# Patient Record
(demographics unavailable — no encounter records)

---

## 2024-10-15 NOTE — ASSESSMENT
[FreeTextEntry1] : UBALDO is a three month old twin with a history of hypoglycemia and known reducible bilateral inguinal hernias who was brought to the ED shortly after discharge form the NICU for irritability. The decision was made to take the patient to the OR.  HE was taken to the OR with Dr. Garay on 9/26 and underwent a laparoscopic bilateral inguinal hernia repair.  He tolerated it well and was discharged home on POD2.  He presents today for a routine post op visit.  On exam his incisions are well healed.  Dr. Garay in to see the examine the patient and is pleased with his recovery.  Mom knows of the small chance of recurrence and will come back in with any concerns.    Follow up with the pediatrician as usual and with pediatric surgery should any new or concerning symptoms arise. All questions answered.

## 2024-10-15 NOTE — REASON FOR VISIT
[____ Week(s)] : [unfilled] week(s)  [Laparoscopic inguinal hernia repair] : laparoscopic inguinal hernia repair [Mother] : mother [Pain] : ~He/She~ does not have pain [Fever] : ~He/She~ does not have fever [Normal bowel movements] : ~He/She~ has normal bowel movements [Vomiting] : ~He/She~ does not have vomiting [Tolerating Diet] : ~He/She~ is tolerating diet [Redness at incision] : ~He/She~ does not have redness at incision [Drainage at incision] : ~He/She~ does not have drainage at incision [Swelling at surgical site] : ~He/She~ does not have swelling at surgical site [de-identified] : 9/24/24 [de-identified] : Dr. Miguel

## 2024-10-15 NOTE — CONSULT LETTER
[Dear  ___] : Dear  [unfilled], [Courtesy Letter:] : I had the pleasure of seeing your patient, [unfilled], in my office today. [Please see my note below.] : Please see my note below. [Consult Closing:] : Thank you very much for allowing me to participate in the care of this patient.  If you have any questions, please do not hesitate to contact me. [Sincerely,] : Sincerely, [FreeTextEntry2] : Dr. Pearson [FreeTextEntry3] : Zaria Morales PA-C Supervisor, [facility] Department of General Pediatric Surgery & Trauma [place], [place] [address]

## 2024-10-15 NOTE — CONSULT LETTER
[Dear  ___] : Dear  [unfilled], [Courtesy Letter:] : I had the pleasure of seeing your patient, [unfilled], in my office today. [Please see my note below.] : Please see my note below. [Consult Closing:] : Thank you very much for allowing me to participate in the care of this patient.  If you have any questions, please do not hesitate to contact me. [Sincerely,] : Sincerely, [FreeTextEntry2] : Dr. Pearson [FreeTextEntry3] : Zaria Morales PA-C Supervisor, [facility] Department of General Pediatric Surgery & Trauma [place], [place] [address]

## 2024-10-15 NOTE — REASON FOR VISIT
[____ Week(s)] : [unfilled] week(s)  [Laparoscopic inguinal hernia repair] : laparoscopic inguinal hernia repair [Mother] : mother [Pain] : ~He/She~ does not have pain [Fever] : ~He/She~ does not have fever [Normal bowel movements] : ~He/She~ has normal bowel movements [Vomiting] : ~He/She~ does not have vomiting [Tolerating Diet] : ~He/She~ is tolerating diet [Redness at incision] : ~He/She~ does not have redness at incision [Drainage at incision] : ~He/She~ does not have drainage at incision [Swelling at surgical site] : ~He/She~ does not have swelling at surgical site [de-identified] : 9/24/24 [de-identified] : Dr. Miguel

## 2024-10-15 NOTE — PHYSICAL EXAM
[Clean] : clean [Dry] : dry [Intact] : intact [Erythema] : no erythema [Granulation tissue] : no granulation tissue [Drainage] : no drainage [Circumcised] : circumcised [Testicle descended on left] : testicle descended on left [Testicle descended on right] : testicle descended on right [NL] : grossly intact

## 2024-10-16 NOTE — DISCUSSION/SUMMARY
[GA at Birth: ___] : GA at Birth: [unfilled] [Chronological Age: ___] : Chronological Age: [unfilled] [Corrected Age: ___] : Corrected Age: [unfilled] [Alert] : alert [] : axial tone normal [Turns head to both sides (0-2 months)] : turns head to both sides (0-2 months) [Moves extremities equally] : moves extremities equally [Moves against gravity] : moves against gravity [Hands to midline (0-3 months)] : hands to midline (0-3 months) [Turns head side to side] : turns head side to side [Lifts head (45 deg 0-2 mon, 90 deg 1-3 mon)] : lifts head (45 degrees 0-2 months, 90 degrees 1-3 months) [Active] : sidelying to supine (1.5 - 2 months) - Active [Passive] : prone to supine (2- 5 months) - Passive [Lag] : Head lag (0-2 months) - lag [Poor] : head control is poor [Gross Grasp] : gross grasp [Release] : release [>] : > [Focusing (2 months)] : focusing (2 months) [Tracking (2 months)] : tracking (2 months) [Supine] : supine [Prone] : prone [Sidelying] : sidelying [PROM] : PROM [FreeTextEntry1] : 32.6 weeks [FreeTextEntry5] : right plagiocephaly [FreeTextEntry3] : Infant tolerated PT session well. Mother educated in developmental positioning packet level I with good understanding. Mother educated in Cervical Rotation & Sidebending ROM. Infant receiving outpatient PT services for torticollis.

## 2024-10-18 NOTE — CONSULT LETTER
[Dear  ___] : Dear  [unfilled], [Courtesy Letter:] : I had the pleasure of seeing your patient, [unfilled], in my office today. [Please see my note below.] : Please see my note below. [Sincerely,] : Sincerely, [FreeTextEntry3] : Elli Anderson MD Attending Neonatologist [facility]

## 2024-10-18 NOTE — CONSULT LETTER
[Dear  ___] : Dear  [unfilled], [Courtesy Letter:] : I had the pleasure of seeing your patient, [unfilled], in my office today. [Please see my note below.] : Please see my note below. [Sincerely,] : Sincerely, [FreeTextEntry3] : Elli Anderson MD Attending Neonatologist [facility]

## 2024-10-18 NOTE — PATIENT INSTRUCTIONS
[FreeTextEntry1] : Developmental Clinic appt     3/7/24     phone: [phone] Next neonatology f/u: 1/2/25 12:15 F/U with ophthalmology  F/U with dermatology for hemangioma (referral made today, script provided) Increase tummy time and left sided play/ positioning due to right sided preference and right plagiocephaly [FreeTextEntry2] : Evaluated by PT today.  Exercises and positioning reviewed and tummy time reinforced [FreeTextEntry3] : Not recommended at this time [FreeTextEntry4] : Continue gentlease 20 layo [FreeTextEntry5] : Continue pepcid 0.5ml, Fe 0.5, and PVS 1.0 [FreeTextEntry6] : n/a [FreeTextEntry7] : n/a [FreeTextEntry8] : per PMD [de-identified] : RSV prevention instructions provided. Eligible for beyfortus Fall 2024, please discuss with PMD. [FreeTextEntry9] : n/a [de-identified] : skin care instructions reviewed with caregiver, aquaphor to skin, avoid direct sun exposure [de-identified] : n/a [de-identified] : n/a

## 2024-10-18 NOTE — PATIENT INSTRUCTIONS
[FreeTextEntry1] : Developmental Clinic appt     3/7/24     phone: [phone] Next neonatology f/u: 1/2/25 12:15 F/U with ophthalmology  F/U with dermatology for hemangioma (referral made today, script provided) Increase tummy time and left sided play/ positioning due to right sided preference and right plagiocephaly [FreeTextEntry2] : Evaluated by PT today.  Exercises and positioning reviewed and tummy time reinforced [FreeTextEntry3] : Not recommended at this time [FreeTextEntry4] : Continue gentlease 20 layo [FreeTextEntry6] : n/a [FreeTextEntry5] : Continue pepcid 0.5ml, Fe 0.5, and PVS 1.0 [FreeTextEntry7] : n/a [FreeTextEntry8] : per PMD [de-identified] : RSV prevention instructions provided. Eligible for beyfortus Fall 2024, please discuss with PMD. [FreeTextEntry9] : n/a [de-identified] : skin care instructions reviewed with caregiver, aquaphor to skin, avoid direct sun exposure [de-identified] : n/a [de-identified] : n/a

## 2024-10-18 NOTE — DATA REVIEWED
[de-identified] : DOL5 TFTs acceptable [de-identified] : AXR 8/2/24: Diffuse bowel distention AXR 6/29/24: much improved with less distention and air into the rectum ,changing gas pattern AXR 6/28/24: dilated bowel loops and thickened wall [de-identified] : HUS 7/15- no IVH, mild asymmetry of lateral vents rt> left). Abd US 6/28 negative [de-identified] : Car seat, CCHD and hearing passed

## 2024-10-18 NOTE — PATIENT INSTRUCTIONS
[FreeTextEntry1] : Developmental Clinic appt     3/7/24     phone: [phone] Next neonatology f/u: 1/2/25 12:15 F/U with ophthalmology  F/U with dermatology for hemangioma (referral made today, script provided) Increase tummy time and left sided play/ positioning due to right sided preference and right plagiocephaly [FreeTextEntry2] : Evaluated by PT today.  Exercises and positioning reviewed and tummy time reinforced [FreeTextEntry3] : Not recommended at this time [FreeTextEntry4] : Continue gentlease 20 layo [FreeTextEntry5] : Continue pepcid 0.5ml, Fe 0.5, and PVS 1.0 [FreeTextEntry6] : n/a [FreeTextEntry7] : n/a [FreeTextEntry8] : per PMD [de-identified] : RSV prevention instructions provided. Eligible for beyfortus Fall 2024, please discuss with PMD. [FreeTextEntry9] : n/a [de-identified] : skin care instructions reviewed with caregiver, aquaphor to skin, avoid direct sun exposure [de-identified] : n/a [de-identified] : n/a

## 2024-10-18 NOTE — BIRTH HISTORY
[de-identified] : 326/7 week di/di Twin B born via primary scheduled  for maternal HELLP to a 33 yo [id number] with PMHx significant for hypothyroid on synthroid, gestational HTN, no meds, endometriosis and uterine polyps. Pregnancy complicated by Twin B IUGR and HELLP.Prenatal labs: B+, GBS unknown, Hep BsAg neg, Hep C neg, RPR NR, Ri, HIV NR.Era Mooney DO, in attendance.L/D: AROM , clear at delivery. Vigorous at delivery, no delayed cord clamping per OB decision. Baby emerged vertex. Received CPAP 5+ with FiO20.3. FiO2 weaned to 0.21. Deep and bulb suction provided. Apgars 8/9. Patient transported to NICU on on CPAP 5+ FiO20.21, in a heated carrier.APGAR Scores: 1min:85min: 9  [de-identified] : Prematurity, , di-di-twin, maternal HELLP, incomplete foreskin, +positional plagiocephaly noted from birth, thermal support , conjunctivitis S/P medical NEC, RDS, hypoglycemia, SGA, anemia, bilateral inguinal hernia, re-admitted for bloody stools

## 2024-10-18 NOTE — DATA REVIEWED
[de-identified] : DOL5 TFTs acceptable [de-identified] : AXR 8/2/24: Diffuse bowel distention AXR 6/29/24: much improved with less distention and air into the rectum ,changing gas pattern AXR 6/28/24: dilated bowel loops and thickened wall [de-identified] : HUS 7/15- no IVH, mild asymmetry of lateral vents rt> left). Abd US 6/28 negative [de-identified] : Car seat, CCHD and hearing passed

## 2024-10-18 NOTE — HISTORY OF PRESENT ILLNESS
[___Formula] : [unfilled] [___ ounces/feeding] : ~FARHAD hoff/feeding [___ Times/day] : [unfilled] times/day [Every ___ hours] : every [unfilled] hours [_____ Times Per] : Stool frequency occurs [unfilled] times per  [Day] : day [Variable amount] : variable  [Soft] : soft [Bloody] : not bloody [Mucousy] : no mucous [de-identified] : D/C [facility] NICU 7/25/24 D/C [facility] 9/21/24 [de-identified] :  High Risk & Developmental follow up NRE 7 - smiles, coos, makes sounds that show happiness/ upset - lifts head and chest when on stomach - keeps head steady in sitting position - opens and shuts hands [de-identified] : [facility] ED 8/2/24 with 3d of lethargy, fussiness, and worsening belly distension, and 2 episodes of bloody stool. Disposition: NICU. D/C home 9/21 9/21 [facility] ED b/l inguinal hernia not reducible. S/P b/l inguinal hernia repair. D/C home 9/24 [de-identified] : GI     Hepatology     Urology [de-identified] : Spit up with every feeds [de-identified] : Enfamil gentlease 20 layo [de-identified] : supine, alone in crib, sleeps 5 hours at night [de-identified] : n/a

## 2024-10-18 NOTE — HISTORY OF PRESENT ILLNESS
[___Formula] : [unfilled] [___ ounces/feeding] : ~FARHAD hoff/feeding [___ Times/day] : [unfilled] times/day [Every ___ hours] : every [unfilled] hours [_____ Times Per] : Stool frequency occurs [unfilled] times per  [Day] : day [Variable amount] : variable  [Soft] : soft [Bloody] : not bloody [Mucousy] : no mucous [de-identified] : D/C [facility] NICU 7/25/24 D/C [facility] 9/21/24 [de-identified] :  High Risk & Developmental follow up NRE 7 - smiles, coos, makes sounds that show happiness/ upset - lifts head and chest when on stomach - keeps head steady in sitting position - opens and shuts hands [de-identified] : [facility] ED 8/2/24 with 3d of lethargy, fussiness, and worsening belly distension, and 2 episodes of bloody stool. Disposition: NICU. D/C home 9/21 9/21 [facility] ED b/l inguinal hernia not reducible. S/P b/l inguinal hernia repair. D/C home 9/24 [de-identified] : GI     Hepatology     Urology [de-identified] : Spit up with every feeds [de-identified] : Enfamil gentlease 20 layo [de-identified] : supine, alone in crib, sleeps 5 hours at night [de-identified] : n/a

## 2024-10-18 NOTE — ASSESSMENT
[FreeTextEntry1] : UBALDO YORK  is a 32.6 week gestation infant, now chronologic age 4m, corrected age 2m 1w seen in  follow-up. Pertinent NICU history includes prematurity, di-di-twin, maternal HELLP, incomplete foreskin, +positional plagiocephaly noted from birth, thermal support, conjunctivitis, S/P medical NEC, RDS, hypoglycemia, SGA, anemia, bilateral inguinal hernia, re-admitted for bloody stools, hypoglycemia, and 2nd treatment for NEC.  The following issues were addressed at this visit.  Growth and nutrition: Weight gain has been 25g/ day over the past 25 days and plots at the 46th percentile for corrected age. Head growth and length are at the 94th and 34th percentiles respectively. Baby is currently feeding gentlease 20 layo. He has a long history of persistent hypoglycemia likely due to inadequate glycogen stores and mobilization after repeated illness and prematurity.  This appears to be now resolved.  Went home from the hospital on 27 layo nutramigen but was able to transition with the pediatrician to 20 layo Gentlease.  Had 4 hour safety fast with glucose of 78.  Plan to have 6 hr fast with pediatrician soon.  No further endocrinology or GI work up needed.   Hx of direct hyperbilirubinemia related to TPN exposure and illness which has also resolved.  The plan is to continue until next NICU [facility] because baby has good weight gain on current regimen. Due to prematurity, solid foods are not recommended until 5-6 months corrected age with good head control. No labs to be obtained today. Continue vitamin supplements.  Development/neuro: baby has developmental delay for chronologic age, was seen by PT/OT today and given home exercises to do. Baby also has right plagiocephaly and right sided preference. Tummy time and left sided play reinforced. Per mom, baby will start PT today (at [facility] in [place]) for torticollis. Early Intervention is not needed at this time. Baby will follow-up with pediatric developmental on 3/7/25.   Anemia: Baby has been on iron supplements and will continue.  Hct 38.6, reviewed and is appropriate for age.  GI: Baby has signs of GERD and is currently on pepcid 0.5ml daily. Reviewed non-pharmacological methods to reduce GERD including holding the baby upright 20-30 minutes post feeding, frequent burping and pacing.   Surg: Baby has a history of inguinal hernia, s/p b/l inguinal hernia repair . Seen today with healed lap sites to abdomen, umbilicus, and b/l groin.  Right eyelid hemangioma : Followed by peds ophthalmology - no concerns for vision or eye.  Eyelid hemangioma followed by dermatology.  Mother holding off on systemic treatment for now due to side effect of hypoglycemia from propranolol.  Reinforced that close follow with dermatology needed.  Breech presentation at birth: Infant is at risk for developmental dysplasia of the the hips. Hip US to be done between 44-46 weeks corrected age.  Script given.  OR Hip US reviewed and is normal.   Other:   Health maintenance: Reviewed routine vaccination schedule with parent as well as guidance for flu vaccine for family, COVID-19 precautions, and need for PMD f/u.  Also discussed bathing and skin care recommendations.   Reviewed notes by (other services)   Next neonatology f/u: ___3 mo______ .

## 2024-10-18 NOTE — CONSULT LETTER
[Dear  ___] : Dear  [unfilled], [Courtesy Letter:] : I had the pleasure of seeing your patient, [unfilled], in my office today. [Please see my note below.] : Please see my note below. [Sincerely,] : Sincerely, [FreeTextEntry3] : Elli Anderson MD Attending Neonatologist [facility]

## 2024-10-18 NOTE — PHYSICAL EXAM
[Pink] : pink [Well Perfused] : well perfused [No Rashes] : no rashes [No Birth Marks] : no birth marks [Conjunctiva Clear] : conjunctiva clear [PERRL] : pupils were equal, round, reactive to light  [Ears Normal Position and Shape] : normal position and shape of ears [Nares Patent] : nares patent [No Nasal Flaring] : no nasal flaring [Moist and Pink Mucous Membranes] : moist and pink mucous membranes [Palate Intact] : palate intact [No Torticollis] : no torticollis [No Neck Masses] : no neck masses [Symmetric Expansion] : symmetric chest expansion [No Retractions] : no retractions [Clear to Auscultation] : lungs clear to auscultation  [Normal S1, S2] : normal S1 and S2 [Regular Rhythm] : regular rhythm [Normal Pulses] : normal pulses [Non Distended] : non distended [Normal Bowel Sounds] : normal bowel sounds [No Umbilical Hernia] : no umbilical hernia [Normal Genitalia] : normal genitalia [No Sacral Dimples] : no sacral dimples [No Scoliosis] : no scoliosis [Normal Range of Motion] : normal range of motion [Normal Posture] : normal posture [No evidence of Hip Dislocation] : no evidence of hip dislocation [Active and Alert] : active and alert [Normal muscle tone] : normal muscle tone of all extremites [Normal truncal tone] : normal truncal tone [Fixes On Faces] : fixes on faces [Follows 180 Degrees] : visual track 180 degrees [[place]] : coos [Turns Head Side to Side in Prone] : turns head side to side in prone [Lifts Head And Chest 45 degress in Prone] : lifts the head and chest 45 degress in prone [Hands Open] : the hands open [Brings Hands to Mouth] : brings hands to mouth [de-identified] : Incisions to sites to abdomen, umbilicus, and b/l groin. [de-identified] : Right plagiocephaly, right sided preference

## 2024-10-18 NOTE — PHYSICAL EXAM
[Pink] : pink [Well Perfused] : well perfused [No Rashes] : no rashes [No Birth Marks] : no birth marks [Conjunctiva Clear] : conjunctiva clear [PERRL] : pupils were equal, round, reactive to light  [Ears Normal Position and Shape] : normal position and shape of ears [Nares Patent] : nares patent [No Nasal Flaring] : no nasal flaring [Moist and Pink Mucous Membranes] : moist and pink mucous membranes [Palate Intact] : palate intact [No Torticollis] : no torticollis [No Neck Masses] : no neck masses [Symmetric Expansion] : symmetric chest expansion [No Retractions] : no retractions [Clear to Auscultation] : lungs clear to auscultation  [Normal S1, S2] : normal S1 and S2 [Regular Rhythm] : regular rhythm [Normal Pulses] : normal pulses [Non Distended] : non distended [Normal Bowel Sounds] : normal bowel sounds [No Umbilical Hernia] : no umbilical hernia [Normal Genitalia] : normal genitalia [No Sacral Dimples] : no sacral dimples [No Scoliosis] : no scoliosis [Normal Range of Motion] : normal range of motion [Normal Posture] : normal posture [No evidence of Hip Dislocation] : no evidence of hip dislocation [Active and Alert] : active and alert [Normal muscle tone] : normal muscle tone of all extremites [Normal truncal tone] : normal truncal tone [Fixes On Faces] : fixes on faces [Follows 180 Degrees] : visual track 180 degrees [[place]] : coos [Turns Head Side to Side in Prone] : turns head side to side in prone [Lifts Head And Chest 45 degress in Prone] : lifts the head and chest 45 degress in prone [Hands Open] : the hands open [Brings Hands to Mouth] : brings hands to mouth [de-identified] : Incisions to sites to abdomen, umbilicus, and b/l groin. [de-identified] : Right plagiocephaly, right sided preference

## 2024-10-18 NOTE — BIRTH HISTORY
[de-identified] : 326/7 week di/di Twin B born via primary scheduled  for maternal HELLP to a 33 yo H2C1liu with PMHx significant for hypothyroid on synthroid, gestational HTN, no meds, endometriosis and uterine polyps. Pregnancy complicated by Twin B IUGR and HELLP.Prenatal labs: B+, GBS unknown, Hep BsAg neg, Hep C neg, RPR NR, Ri, HIV NR.Era Mooney DO, in attendance.L/D: AROM , clear at delivery. Vigorous at delivery, no delayed cord clamping per OB decision. Baby emerged vertex. Received CPAP 5+ with FiO20.3. FiO2 weaned to 0.21. Deep and bulb suction provided. Apgars 8/9. Patient transported to NICU on on CPAP 5+ FiO20.21, in a heated carrier.APGAR Scores: 1min:85min: 9  [de-identified] : Prematurity, , di-di-twin, maternal HELLP, incomplete foreskin, +positional plagiocephaly noted from birth, thermal support , conjunctivitis S/P medical NEC, RDS, hypoglycemia, SGA, anemia, bilateral inguinal hernia, re-admitted for bloody stools

## 2024-10-18 NOTE — DATA REVIEWED
[de-identified] : DOL5 TFTs acceptable [de-identified] : AXR 8/2/24: Diffuse bowel distention AXR 6/29/24: much improved with less distention and air into the rectum ,changing gas pattern AXR 6/28/24: dilated bowel loops and thickened wall [de-identified] : HUS 7/15- no IVH, mild asymmetry of lateral vents rt> left). Abd US 6/28 negative [de-identified] : Car seat, CCHD and hearing passed

## 2024-10-18 NOTE — BIRTH HISTORY
[de-identified] : 326/7 week di/di Twin B born via primary scheduled  for maternal HELLP to a 33 yo D2H8uje with PMHx significant for hypothyroid on synthroid, gestational HTN, no meds, endometriosis and uterine polyps. Pregnancy complicated by Twin B IUGR and HELLP.Prenatal labs: B+, GBS unknown, Hep BsAg neg, Hep C neg, RPR NR, Ri, HIV NR.Era Mooney DO, in attendance.L/D: AROM , clear at delivery. Vigorous at delivery, no delayed cord clamping per OB decision. Baby emerged vertex. Received CPAP 5+ with FiO20.3. FiO2 weaned to 0.21. Deep and bulb suction provided. Apgars 8/9. Patient transported to NICU on on CPAP 5+ FiO20.21, in a heated carrier.APGAR Scores: 1min:85min: 9  [de-identified] : Prematurity, , di-di-twin, maternal HELLP, incomplete foreskin, +positional plagiocephaly noted from birth, thermal support , conjunctivitis S/P medical NEC, RDS, hypoglycemia, SGA, anemia, bilateral inguinal hernia, re-admitted for bloody stools

## 2024-10-18 NOTE — ASSESSMENT
[FreeTextEntry1] : UBALDO YORK  is a 32.6 week gestation infant, now chronologic age 4m, corrected age 2m 1w seen in  follow-up. Pertinent NICU history includes prematurity, di-di-twin, maternal HELLP, incomplete foreskin, +positional plagiocephaly noted from birth, thermal support, conjunctivitis, S/P medical NEC, RDS, hypoglycemia, SGA, anemia, bilateral inguinal hernia, re-admitted for bloody stools, hypoglycemia, and 2nd treatment for NEC.  The following issues were addressed at this visit.  Growth and nutrition: Weight gain has been 25g/ day over the past 25 days and plots at the 46th percentile for corrected age. Head growth and length are at the 94th and 34th percentiles respectively. Baby is currently feeding gentlease 20 layo. He has a long history of persistent hypoglycemia likely due to inadequate glycogen stores and mobilization after repeated illness and prematurity.  This appears to be now resolved.  Went home from the hospital on 27 layo nutramigen but was able to transition with the pediatrician to 20 layo Gentlease.  Had 4 hour safety fast with glucose of 78.  Plan to have 6 hr fast with pediatrician soon.  No further endocrinology or GI work up needed.   Hx of direct hyperbilirubinemia related to TPN exposure and illness which has also resolved.  The plan is to continue until next NICU GRAD Clinic because baby has good weight gain on current regimen. Due to prematurity, solid foods are not recommended until 5-6 months corrected age with good head control. No labs to be obtained today. Continue vitamin supplements.  Development/neuro: baby has developmental delay for chronologic age, was seen by PT/OT today and given home exercises to do. Baby also has right plagiocephaly and right sided preference. Tummy time and left sided play reinforced. Per mom, baby will start PT today (at [facility] in [place]) for torticollis. Early Intervention is not needed at this time. Baby will follow-up with pediatric developmental on 3/7/25.   Anemia: Baby has been on iron supplements and will continue.  Hct 38.6, reviewed and is appropriate for age.  GI: Baby has signs of GERD and is currently on pepcid 0.5ml daily. Reviewed non-pharmacological methods to reduce GERD including holding the baby upright 20-30 minutes post feeding, frequent burping and pacing.   Surg: Baby has a history of inguinal hernia, s/p b/l inguinal hernia repair . Seen today with healed lap sites to abdomen, umbilicus, and b/l groin.  Right eyelid hemangioma : Followed by peds ophthalmology - no concerns for vision or eye.  Eyelid hemangioma followed by dermatology.  Mother holding off on systemic treatment for now due to side effect of hypoglycemia from propranolol.  Reinforced that close follow with dermatology needed.  Breech presentation at birth: Infant is at risk for developmental dysplasia of the the hips. Hip US to be done between 44-46 weeks corrected age.  Script given.  OR Hip US reviewed and is normal.   Other:   Health maintenance: Reviewed routine vaccination schedule with parent as well as guidance for flu vaccine for family, COVID-19 precautions, and need for PMD f/u.  Also discussed bathing and skin care recommendations.   Reviewed notes by (other services)   Next neonatology f/u: ___3 mo______ .

## 2024-10-18 NOTE — HISTORY OF PRESENT ILLNESS
[___Formula] : [unfilled] [___ ounces/feeding] : ~FARHAD hoff/feeding [___ Times/day] : [unfilled] times/day [Every ___ hours] : every [unfilled] hours [_____ Times Per] : Stool frequency occurs [unfilled] times per  [Day] : day [Variable amount] : variable  [Soft] : soft [Bloody] : not bloody [Mucousy] : no mucous [de-identified] : D/C [facility] NICU 7/25/24 D/C [facility] 9/21/24 [de-identified] :  High Risk & Developmental follow up NRE 7 - smiles, coos, makes sounds that show happiness/ upset - lifts head and chest when on stomach - keeps head steady in sitting position - opens and shuts hands [de-identified] : [facility] 8/2/24 with 3d of lethargy, fussiness, and worsening belly distension, and 2 episodes of bloody stool. Disposition: NICU. D/C home 9/21 9/21 [facility] b/l inguinal hernia not reducible. S/P b/l inguinal hernia repair. D/C home 9/24 [de-identified] : GI     Hepatology     Urology [de-identified] : Spit up with every feeds [de-identified] : Enfamil gentlease 20 layo [de-identified] : supine, alone in crib, sleeps 5 hours at night [de-identified] : n/a

## 2024-10-18 NOTE — DATA REVIEWED
[de-identified] : DOL5 TFTs acceptable [de-identified] : AXR 8/2/24: Diffuse bowel distention AXR 6/29/24: much improved with less distention and air into the rectum ,changing gas pattern AXR 6/28/24: dilated bowel loops and thickened wall [de-identified] : HUS 7/15- no IVH, mild asymmetry of lateral vents rt> left). Abd US 6/28 negative [de-identified] : Car seat, CCHD and hearing passed

## 2024-10-18 NOTE — BIRTH HISTORY
[de-identified] : 326/7 week di/di Twin B born via primary scheduled  for maternal HELLP to a 35 yo [id number] with PMHx significant for hypothyroid on synthroid, gestational HTN, no meds, endometriosis and uterine polyps. Pregnancy complicated by Twin B IUGR and HELLP.Prenatal labs: B+, GBS unknown, Hep BsAg neg, Hep C neg, RPR NR, Ri, HIV NR.Era Mooney DO, in attendance.L/D: AROM , clear at delivery. Vigorous at delivery, no delayed cord clamping per OB decision. Baby emerged vertex. Received CPAP 5+ with FiO20.3. FiO2 weaned to 0.21. Deep and bulb suction provided. Apgars 8/9. Patient transported to NICU on on CPAP 5+ FiO20.21, in a heated carrier.APGAR Scores: 1min:85min: 9  [de-identified] : Prematurity, , di-di-twin, maternal HELLP, incomplete foreskin, +positional plagiocephaly noted from birth, thermal support , conjunctivitis S/P medical NEC, RDS, hypoglycemia, SGA, anemia, bilateral inguinal hernia, re-admitted for bloody stools

## 2024-10-18 NOTE — CONSULT LETTER
[Dear  ___] : Dear  [unfilled], [Courtesy Letter:] : I had the pleasure of seeing your patient, [unfilled], in my office today. [Please see my note below.] : Please see my note below. [Sincerely,] : Sincerely, [FreeTextEntry3] : Elli Anderson MD Attending Neonatologist [facility]

## 2024-10-18 NOTE — PHYSICAL EXAM
[Pink] : pink [Well Perfused] : well perfused [No Rashes] : no rashes [No Birth Marks] : no birth marks [Conjunctiva Clear] : conjunctiva clear [PERRL] : pupils were equal, round, reactive to light  [Ears Normal Position and Shape] : normal position and shape of ears [Nares Patent] : nares patent [No Nasal Flaring] : no nasal flaring [Moist and Pink Mucous Membranes] : moist and pink mucous membranes [Palate Intact] : palate intact [No Torticollis] : no torticollis [No Neck Masses] : no neck masses [Symmetric Expansion] : symmetric chest expansion [No Retractions] : no retractions [Clear to Auscultation] : lungs clear to auscultation  [Normal S1, S2] : normal S1 and S2 [Regular Rhythm] : regular rhythm [Normal Pulses] : normal pulses [Non Distended] : non distended [Normal Bowel Sounds] : normal bowel sounds [No Umbilical Hernia] : no umbilical hernia [Normal Genitalia] : normal genitalia [No Sacral Dimples] : no sacral dimples [No Scoliosis] : no scoliosis [Normal Range of Motion] : normal range of motion [Normal Posture] : normal posture [No evidence of Hip Dislocation] : no evidence of hip dislocation [Active and Alert] : active and alert [Normal muscle tone] : normal muscle tone of all extremites [Normal truncal tone] : normal truncal tone [Fixes On Faces] : fixes on faces [Follows 180 Degrees] : visual track 180 degrees [Dougherty] : coos [Turns Head Side to Side in Prone] : turns head side to side in prone [Lifts Head And Chest 45 degress in Prone] : lifts the head and chest 45 degress in prone [Hands Open] : the hands open [Brings Hands to Mouth] : brings hands to mouth [de-identified] : Incisions to sites to abdomen, umbilicus, and b/l groin. [de-identified] : Right plagiocephaly, right sided preference

## 2024-10-18 NOTE — ASSESSMENT
[FreeTextEntry1] : UBALDO YORK  is a 32.6 week gestation infant, now chronologic age 4m, corrected age 2m 1w seen in  follow-up. Pertinent NICU history includes prematurity, di-di-twin, maternal HELLP, incomplete foreskin, +positional plagiocephaly noted from birth, thermal support, conjunctivitis, S/P medical NEC, RDS, hypoglycemia, SGA, anemia, bilateral inguinal hernia, re-admitted for bloody stools, hypoglycemia, and 2nd treatment for NEC.  The following issues were addressed at this visit.  Growth and nutrition: Weight gain has been 25g/ day over the past 25 days and plots at the 46th percentile for corrected age. Head growth and length are at the 94th and 34th percentiles respectively. Baby is currently feeding gentlease 20 layo. He has a long history of persistent hypoglycemia likely due to inadequate glycogen stores and mobilization after repeated illness and prematurity.  This appears to be now resolved.  Went home from the hospital on 27 layo nutramigen but was able to transition with the pediatrician to 20 layo Gentlease.  Had 4 hour safety fast with glucose of 78.  Plan to have 6 hr fast with pediatrician soon.  No further endocrinology or GI work up needed.   Hx of direct hyperbilirubinemia related to TPN exposure and illness which has also resolved.  The plan is to continue until next NICU GRAD Clinic because baby has good weight gain on current regimen. Due to prematurity, solid foods are not recommended until 5-6 months corrected age with good head control. No labs to be obtained today. Continue vitamin supplements.  Development/neuro: baby has developmental delay for chronologic age, was seen by PT/OT today and given home exercises to do. Baby also has right plagiocephaly and right sided preference. Tummy time and left sided play reinforced. Per mom, baby will start PT today (at [facility] in [place]) for torticollis. Early Intervention is not needed at this time. Baby will follow-up with pediatric developmental on 3/7/25.   Anemia: Baby has been on iron supplements and will continue.  Hct 38.6, reviewed and is appropriate for age.  GI: Baby has signs of GERD and is currently on pepcid 0.5ml daily. Reviewed non-pharmacological methods to reduce GERD including holding the baby upright 20-30 minutes post feeding, frequent burping and pacing.   Surg: Baby has a history of inguinal hernia, s/p b/l inguinal hernia repair . Seen today with healed lap sites to abdomen, umbilicus, and b/l groin.  Right eyelid hemangioma : Followed by peds ophthalmology - no concerns for vision or eye.  Eyelid hemangioma followed by dermatology.  Mother holding off on systemic treatment for now due to side effect of hypoglycemia from propranolol.  Reinforced that close follow with dermatology needed.  Breech presentation at birth: Infant is at risk for developmental dysplasia of the the hips. Hip US to be done between 44-46 weeks corrected age.  Script given.  OR Hip US reviewed and is normal.   Other:   Health maintenance: Reviewed routine vaccination schedule with parent as well as guidance for flu vaccine for family, COVID-19 precautions, and need for PMD f/u.  Also discussed bathing and skin care recommendations.   Reviewed notes by (other services)   Next neonatology f/u: ___3 mo______ .

## 2024-10-18 NOTE — PATIENT INSTRUCTIONS
[FreeTextEntry1] : [facility] appt     3/7/24     phone: [phone] Next neonatology f/u: 1/2/25 12:15 F/U with ophthalmology  F/U with dermatology for hemangioma (referral made today, script provided) Increase tummy time and left sided play/ positioning due to right sided preference and right plagiocephaly [FreeTextEntry2] : Evaluated by PT today.  Exercises and positioning reviewed and tummy time reinforced [FreeTextEntry3] : Not recommended at this time [FreeTextEntry4] : Continue gentlease 20 layo [FreeTextEntry5] : Continue pepcid 0.5ml, Fe 0.5, and PVS 1.0 [FreeTextEntry6] : n/a [FreeTextEntry7] : n/a [FreeTextEntry8] : per PMD [de-identified] : RSV prevention instructions provided. Eligible for beyfortus Fall 2024, please discuss with PMD. [FreeTextEntry9] : n/a [de-identified] : skin care instructions reviewed with caregiver, aquaphor to skin, avoid direct sun exposure [de-identified] : n/a [de-identified] : n/a

## 2024-10-18 NOTE — ASSESSMENT
[FreeTextEntry1] : UBALDO YORK  is a 32.6 week gestation infant, now chronologic age 4m, corrected age 2m 1w seen in  follow-up. Pertinent NICU history includes prematurity, di-di-twin, maternal HELLP, incomplete foreskin, +positional plagiocephaly noted from birth, thermal support, conjunctivitis, S/P medical NEC, RDS, hypoglycemia, SGA, anemia, bilateral inguinal hernia, re-admitted for bloody stools, hypoglycemia, and 2nd treatment for NEC.  The following issues were addressed at this visit.  Growth and nutrition: Weight gain has been 25g/ day over the past 25 days and plots at the 46th percentile for corrected age. Head growth and length are at the 94th and 34th percentiles respectively. Baby is currently feeding gentlease 20 layo. He has a long history of persistent hypoglycemia likely due to inadequate glycogen stores and mobilization after repeated illness and prematurity.  This appears to be now resolved.  Went home from the hospital on 27 layo nutramigen but was able to transition with the pediatrician to 20 layo Gentlease.  Had 4 hour safety fast with glucose of 78.  Plan to have 6 hr fast with pediatrician soon.  No further endocrinology or GI work up needed.   Hx of direct hyperbilirubinemia related to TPN exposure and illness which has also resolved.  The plan is to continue until next NICU GRAD Clinic because baby has good weight gain on current regimen. Due to prematurity, solid foods are not recommended until 5-6 months corrected age with good head control. No labs to be obtained today. Continue vitamin supplements.  Development/neuro: baby has developmental delay for chronologic age, was seen by PT/OT today and given home exercises to do. Baby also has right plagiocephaly and right sided preference. Tummy time and left sided play reinforced. Per mom, baby will start PT today (at [facility] in [place]) for torticollis. Early Intervention is not needed at this time. Baby will follow-up with pediatric developmental on 3/7/25.   Anemia: Baby has been on iron supplements and will continue.  Hct 38.6, reviewed and is appropriate for age.  GI: Baby has signs of GERD and is currently on pepcid 0.5ml daily. Reviewed non-pharmacological methods to reduce GERD including holding the baby upright 20-30 minutes post feeding, frequent burping and pacing.   Surg: Baby has a history of inguinal hernia, s/p b/l inguinal hernia repair . Seen today with healed lap sites to abdomen, umbilicus, and b/l groin.  Right eyelid hemangioma : Followed by peds ophthalmology - no concerns for vision or eye.  Eyelid hemangioma followed by dermatology.  Mother holding off on systemic treatment for now due to side effect of hypoglycemia from propranolol.  Reinforced that close follow with dermatology needed.  Breech presentation at birth: Infant is at risk for developmental dysplasia of the the hips. Hip US to be done between 44-46 weeks corrected age.  Script given.  OR Hip US reviewed and is normal.   Other:   Health maintenance: Reviewed routine vaccination schedule with parent as well as guidance for flu vaccine for family, COVID-19 precautions, and need for PMD f/u.  Also discussed bathing and skin care recommendations.   Reviewed notes by (other services)   Next neonatology f/u: ___3 mo______ .

## 2024-10-18 NOTE — PHYSICAL EXAM
[Pink] : pink [Well Perfused] : well perfused [No Rashes] : no rashes [No Birth Marks] : no birth marks [Conjunctiva Clear] : conjunctiva clear [PERRL] : pupils were equal, round, reactive to light  [Ears Normal Position and Shape] : normal position and shape of ears [Nares Patent] : nares patent [No Nasal Flaring] : no nasal flaring [Moist and Pink Mucous Membranes] : moist and pink mucous membranes [Palate Intact] : palate intact [No Torticollis] : no torticollis [No Neck Masses] : no neck masses [Symmetric Expansion] : symmetric chest expansion [No Retractions] : no retractions [Clear to Auscultation] : lungs clear to auscultation  [Normal S1, S2] : normal S1 and S2 [Regular Rhythm] : regular rhythm [Normal Pulses] : normal pulses [Non Distended] : non distended [Normal Bowel Sounds] : normal bowel sounds [No Umbilical Hernia] : no umbilical hernia [Normal Genitalia] : normal genitalia [No Sacral Dimples] : no sacral dimples [No Scoliosis] : no scoliosis [Normal Range of Motion] : normal range of motion [Normal Posture] : normal posture [No evidence of Hip Dislocation] : no evidence of hip dislocation [Active and Alert] : active and alert [Normal muscle tone] : normal muscle tone of all extremites [Normal truncal tone] : normal truncal tone [Fixes On Faces] : fixes on faces [Follows 180 Degrees] : visual track 180 degrees [Hinds] : coos [Turns Head Side to Side in Prone] : turns head side to side in prone [Lifts Head And Chest 45 degress in Prone] : lifts the head and chest 45 degress in prone [Hands Open] : the hands open [Brings Hands to Mouth] : brings hands to mouth [de-identified] : Incisions to sites to abdomen, umbilicus, and b/l groin. [de-identified] : Right plagiocephaly, right sided preference

## 2024-10-18 NOTE — HISTORY OF PRESENT ILLNESS
[___Formula] : [unfilled] [___ ounces/feeding] : ~FARHAD hoff/feeding [___ Times/day] : [unfilled] times/day [Every ___ hours] : every [unfilled] hours [_____ Times Per] : Stool frequency occurs [unfilled] times per  [Day] : day [Variable amount] : variable  [Soft] : soft [Bloody] : not bloody [Mucousy] : no mucous [de-identified] : D/C [facility] NICU 7/25/24 D/C [facility] 9/21/24 [de-identified] :  High Risk & Developmental follow up NRE 7 - smiles, coos, makes sounds that show happiness/ upset - lifts head and chest when on stomach - keeps head steady in sitting position - opens and shuts hands [de-identified] : [facility] 8/2/24 with 3d of lethargy, fussiness, and worsening belly distension, and 2 episodes of bloody stool. Disposition: NICU. D/C home 9/21 9/21 [facility] b/l inguinal hernia not reducible. S/P b/l inguinal hernia repair. D/C home 9/24 [de-identified] : Spit up with every feeds [de-identified] : GI     Hepatology     Urology [de-identified] : Enfamil gentlease 20 layo [de-identified] : supine, alone in crib, sleeps 5 hours at night [de-identified] : n/a

## 2024-11-05 NOTE — CONSULT LETTER
[Dear  ___] : Dear  [unfilled], [Consult Letter:] : I had the pleasure of evaluating your patient, [unfilled]. [Please see my note below.] : Please see my note below. [Consult Closing:] : Thank you very much for allowing me to participate in the care of this patient.  If you have any questions, please do not hesitate to contact me. [Sincerely,] : Sincerely, [FreeTextEntry3] : Mia Alonso MD  [facility]ermat[facility]

## 2024-11-05 NOTE — PHYSICAL EXAM
[FreeTextEntry3] : 1x0.5cm subcutaneous nodule with blue hue on R lateral upper eyelid scaly erythematous plaques on buttocks  deviated gluteal cleft    Pre hemangeol 144 1 hr post  while sleeping 2 hr post  while awake

## 2024-11-05 NOTE — ASSESSMENT
[FreeTextEntry1] :  #Infantile Hemangioma x 1 on R lateral upper eyelid  - Hemangiomas were prev discussed in detail, including treatment indications and options and natural history. These lesions occur in 5-10% of all children in the [place] and represent a benign tumor of blood vessels and endothelial cells. The vast majority of hemangiomas are uncomplicated and are followed conservatively without therapy. Treatment is indicated, however, for disfiguring, bleeding, ulcerated or medically-complicated lesions. - Topical vs. oral BB discussed in detail, given location and severity of hemangioma, would recommend tx with oral hemangeol at this time - Last hypoglycemic episode > 1m ago - Hemangeol started in office with 0.3ml (0.5mg/kg) 11/4, after feeding, LOT: 148 Exp: 2/26 - Pt tolerated medication well, HRs as above - START Hemangeol, give 0.3 ml PO BID after feeds (0.5 mg/kg div BID), once see by cardiology can increase by 0.1 ml until reach 1 ml PO BID (1.5 mg/kd/day div BID) - Call if starts to scab - Recommend pt see cardiologist for initial eval while pt starts this medication  #High Risk Medication use - Risks of propranolol/hemangeol include low blood pressure, low blood sugar, low heart rate, low body temperature, and night terrors. - Counseled to only administer after a feed and to hold if baby is not feeding, wheezing, or any concerns. -If pt spits up do not give additional medication. Do not give medication if not eating. If infant misses a dose do not double up. Instructed to hold in case of any serious respiratory illness - Discussed doses should be given approx 6 hours apart, and 3 hours prior to bedtime - [facility]  #Gluteal crease abnormality - spinal u/s ordered, f/u results  #Diaper rash -  likely irritated by zinc paste - STOP using zinc - START mix nystatin ointment + mupirocin ointment TID to AA. Cover buttocks liberally with vaseline each diaper change  RTC 3 weeks

## 2024-11-05 NOTE — HISTORY OF PRESENT ILLNESS
[FreeTextEntry1] : NPA hemangioma  [de-identified] : Marek is a 4m ex 32w 6 day, born via c section, s/p HELLP, was initially in [facility] NICU x 1 month- had NEC, came home for four days, and went back to [facility], was anemic and hypoglycemic and tx for NEC again -complicated by consistent hypogylcemic episodes, most recent at the end of September here for evaluation of below #Hemangioma, R eyelid  - no current tx - noticed the spot 1 month ago, went to ophthalmologist. said to observe for now  - mom feels spot  getting bigger  s- aveeno d: chriss turner m: tubby  jose    #rash on buttocks - use zinc and econazole, does not feel like its been helping   No other changing or concerning lesions. No itchy, growing, bleeding, painful, or changing moles.

## 2024-11-05 NOTE — CONSULT LETTER
[Dear  ___] : Dear  [unfilled], [Consult Letter:] : I had the pleasure of evaluating your patient, [unfilled]. [Please see my note below.] : Please see my note below. [Consult Closing:] : Thank you very much for allowing me to participate in the care of this patient.  If you have any questions, please do not hesitate to contact me. [Sincerely,] : Sincerely, [FreeTextEntry3] : Mia Alonso MD  [facility] Pediatric Dermatology

## 2024-11-05 NOTE — HISTORY OF PRESENT ILLNESS
[FreeTextEntry1] : NPA hemangioma  [de-identified] : Marek is a 4m ex 32w 6 day, born via c section, s/p HELLP, was initially in [facility] NICU x 1 month- had NEC, came home for four days, and went back to [facility], was anemic and hypoglycemic and tx for NEC again -complicated by consistent hypogylcemic episodes, most recent at the end of September here for evaluation of below #Hemangioma, R eyelid  - no current tx - noticed the spot 1 month ago, went to ophthalmologist. said to observe for now  - mom feels spot  getting bigger  s- aveeno d: chriss turner m: tubby  jose    #rash on buttocks - use zinc and econazole, does not feel like its been helping   No other changing or concerning lesions. No itchy, growing, bleeding, painful, or changing moles.

## 2024-11-05 NOTE — ASSESSMENT
[FreeTextEntry1] :  #Infantile Hemangioma x 1 on R lateral upper eyelid  - Hemangiomas were prev discussed in detail, including treatment indications and options and natural history. These lesions occur in 5-10% of all children in the [place] and represent a benign tumor of blood vessels and endothelial cells. The vast majority of hemangiomas are uncomplicated and are followed conservatively without therapy. Treatment is indicated, however, for disfiguring, bleeding, ulcerated or medically-complicated lesions. - Topical vs. oral BB discussed in detail, given location and severity of hemangioma, would recommend tx with oral hemangeol at this time - Last hypoglycemic episode > 1m ago - Hemangeol started in office with 0.3ml (0.5mg/kg) 11/4, after feeding, LOT: 148 Exp: 2/26 - Pt tolerated medication well, HRs as above - START Hemangeol, give 0.3 ml PO BID after feeds (0.5 mg/kg div BID), once see by cardiology can increase by 0.1 ml until reach 1 ml PO BID (1.5 mg/kd/day div BID) - Call if starts to scab - Recommend pt see cardiologist for initial eval while pt starts this medication  #High Risk Medication use - Risks of propranolol/hemangeol include low blood pressure, low blood sugar, low heart rate, low body temperature, and night terrors. - Counseled to only administer after a feed and to hold if baby is not feeding, wheezing, or any concerns. -If pt spits up do not give additional medication. Do not give medication if not eating. If infant misses a dose do not double up. Instructed to hold in case of any serious respiratory illness - Discussed doses should be given approx 6 hours apart, and 3 hours prior to bedtime - [facility] in [facility]  #Gluteal crease abnormality - spinal u/s ordered, f/u results  #Diaper rash -  likely irritated by zinc paste - STOP using zinc - START mix nystatin ointment + mupirocin ointment TID to AA. Cover buttocks liberally with vaseline each diaper change  RTC 3 weeks

## 2024-11-25 NOTE — ASSESSMENT
[FreeTextEntry1] :  #Infantile Hemangioma x 1 on R lateral upper eyelid- improving - Hemangiomas were prev discussed in detail, including treatment indications and options and natural history. These lesions occur in 5-10% of all children in the [place] and represent a benign tumor of blood vessels and endothelial cells. The vast majority of hemangiomas are uncomplicated and are followed conservatively without therapy. Treatment is indicated, however, for disfiguring, bleeding, ulcerated or medically-complicated lesions. - c/w Hemangeol, cleared by cardiology, can increase from 1 ml until reach 1.4 ml PO BID (2 mg/kd/day div BID) - Call if starts to scab  #High Risk Medication use - Risks of propranolol/hemangeol include low blood pressure, low blood sugar, low heart rate, low body temperature, and night terrors. - Counseled to only administer after a feed and to hold if baby is not feeding, wheezing, or any concerns. -If pt spits up do not give additional medication. Do not give medication if not eating. If infant misses a dose do not double up. Instructed to hold in case of any serious respiratory illness - Discussed doses should be given approx 6 hours apart, and 3 hours prior to bedtime - [facility]ent rx to [facility] in [facility]  RTC 6 weeks

## 2024-11-25 NOTE — CONSULT LETTER
[Dear  ___] : Dear  [unfilled], [Consult Letter:] : I had the pleasure of evaluating your patient, [unfilled]. [Please see my note below.] : Please see my note below. [Consult Closing:] : Thank you very much for allowing me to participate in the care of this patient.  If you have any questions, please do not hesitate to contact me. [Sincerely,] : Sincerely, [FreeTextEntry3] : Mia Alonso MD  [facility] Pediatric Dermatology

## 2024-11-25 NOTE — HISTORY OF PRESENT ILLNESS
[FreeTextEntry1] : f/u Hemangioma  [de-identified] : Marek is a 5m ex 32w 6 day, #Hemangioma, R eyelid on Hemangeol 1ml PO BID after feeds, tolerating well (1.5mg/kg/day div BID)  background hx: - born via c section, s/p HELLP, was initially in [facility] NICU x 1 month- had NEC, came home for four days, and went back to [facility], was anemic and hypoglycemic and tx for NEC again -complicated by consistent hypogylcemic episodes, most recent at the end of September here for evaluation of below - noticed the spot 1 month ago, went to ophthalmologist. said to observe for now  - mom feels spot  getting bigger  s- aveeno d: chriss turner m: tubby  jose    #rash on buttocks - use zinc and econazole, does not feel like its been helping   No other changing or concerning lesions. No itchy, growing, bleeding, painful, or changing moles.

## 2024-11-25 NOTE — PHYSICAL EXAM
[Alert] : alert [Well Nourished] : well nourished [Conjunctiva Non-injected] : conjunctiva non-injected [No Visual Lymphadenopathy] : no visual  lymphadenopathy [No Clubbing] : no clubbing [No Edema] : no edema [No Bromhidrosis] : no bromhidrosis [No Chromhidrosis] : no chromhidrosis [FreeTextEntry3] : 1x0.5cm subcutaneous nodule with blue hue on R lateral upper eyelid scaly erythematous plaques on buttocks  deviated gluteal cleft

## 2024-12-31 NOTE — HISTORY OF PRESENT ILLNESS
[Gestational Age: ___] : Gestational Age: [unfilled] [Chronological Age: ___] : Chronological Age: [unfilled] [Corrected Age: ___] : Corrected Age: [unfilled] [Date of D/C: ___] : Date of D/C: [unfilled] [Developmental Pediatrics: ___] : Developmental Pediatrics: [unfilled] [Ophthalmology: ___] : Ophthalmology: [unfilled] [de-identified] : [place] [facility] 7[place]25/24 [place] [facility] [place]  [facility] [place]b/l inguinal hernia not reducible. S/P b/l inguinal hernia repair [de-identified] :  High Risk & Developmental follow up NRE 7  [de-identified] : GI     Hepatology   Urology [de-identified] : n/a

## 2024-12-31 NOTE — CONSULT LETTER
[Dear  ___] : Dear  [unfilled], [Courtesy Letter:] : I had the pleasure of seeing your patient, [unfilled], in my office today. [Please see my note below.] : Please see my note below. [Sincerely,] : Sincerely, [FreeTextEntry3] : Elli Anderson MD Attending Neonatologist [facility]

## 2024-12-31 NOTE — REASON FOR VISIT
[Follow-Up] : a follow-up visit for [Weight Check] : weight check [Developmental Delay] : developmental delay [Medical Records] : medical records [FreeTextEntry3] : 32.6 twin week gestation

## 2024-12-31 NOTE — PATIENT INSTRUCTIONS
[Verbal patient instructions provided] : Verbal patient instructions provided. [FreeTextEntry1] : Developmental Clinic appt     3/7/24     phone: [phone]  [FreeTextEntry6] : n/a [FreeTextEntry7] : n/a [FreeTextEntry8] : per PMD [FreeTextEntry9] : n/a [de-identified] : skin care instructions reviewed with caregiver, aquaphor to skin, avoid direct sun exposure

## 2024-12-31 NOTE — BIRTH HISTORY
[Birthweight ___ kg] : weight [unfilled] kg [Weight ___ kg] : weight [unfilled] kg [de-identified] : 326/7 week di/di Twin B born via primary scheduled  for maternal HELLP to a 35 yo [id number] with PMHx significant for hypothyroid on synthroid, gestational HTN, no meds, endometriosis and uterine polyps. Pregnancy complicated by Twin B IUGR and HELLP.Prenatal labs: B+, GBS unknown, Hep BsAg neg, Hep C neg, RPR NR, Ri, HIV NR.Era Mooney DO, in attendance.L/D: AROM , clear at delivery. Vigorous at delivery, no delayed cord clamping per OB decision. Baby emerged vertex. Received CPAP 5+ with FiO20.3. FiO2 weaned to 0.21. Deep and bulb suction provided. Apgars 8/9. Patient transported to NICU on on CPAP 5+ FiO20.21, in a heated carrier.APGAR Scores: 1min:85min: 9  [de-identified] : Prematurity, , di-di-twin, maternal HELLP, incomplete foreskin, +positional plagiocephaly noted from birth, thermal support , conjunctivitis S/P medical NEC, RDS, hypoglycemia, SGA, anemia, bilateral inguinal hernia, re-admitted for bloody stools

## 2025-01-23 NOTE — CONSULT LETTER
[FreeTextEntry1] : Dear Dr. MONIKA BLACKWOOD,      I had the pleasure of consulting on UBALDO YORK today.  Below is my note regarding the office visit today.      Thank you so very much for allowing me to participate in UBALDO's care.  Please don't hesitate to call me should any questions or issues arise.        Sincerely,     Steve Arora MD, FACS, [facility]    Chief, Pediatric Urology    Professor of Urology and Pediatrics    [employer]        President, American Urological Association - [place] Section    Past-President, Societies for Pediatric Urology

## 2025-01-23 NOTE — PHYSICAL EXAM
[TextBox_92] : PENIS: Penile torsion, uncircumcised penis with foreshortened prepuce. Meatus orthotopic.  SCROTUM: Bilaterally symmetric testes in dependent position without palpable mass, hernia or hydrocele

## 2025-01-23 NOTE — CONSULT LETTER
[FreeTextEntry1] : Dear Dr. MONIKA BLACKWOOD,      I had the pleasure of consulting on UBALDO YORK today.  Below is my note regarding the office visit today.      Thank you so very much for allowing me to participate in UBALDO's care.  Please don't hesitate to call me should any questions or issues arise.        Sincerely,     Steve Arora MD, FACS, [facility]    Chief, Pediatric Urology    Professor of Urology and Pediatrics    [employer]        President, American Urological Association - [place] Section    Past-President, Societies for Pediatric Urology

## 2025-01-23 NOTE — HISTORY OF PRESENT ILLNESS
[TextBox_4] : UBALDO presents today for an initial consultation. He is an ex-32.6 weeker who (was/was not) circumcised as a . S/p bilateral inguinal hernia repair 24. Family reports concern for incomplete foreskin. The penis has not changed in its configuration since the parents first noticed this. No penile redness or urinary tract infections. He makes ample wet diapers without hematuria. No family history of penile abnormalities.

## 2025-01-23 NOTE — CONSULT LETTER
[FreeTextEntry1] : Dear Dr. MONIKA BLACKWOOD,      I had the pleasure of consulting on UBALDO YORK today.  Below is my note regarding the office visit today.      Thank you so very much for allowing me to participate in UBALDO's care.  Please don't hesitate to call me should any questions or issues arise.        Sincerely,     Steve Arora MD, FACS, [facility]    Chief, Pediatric Urology    Professor of Urology and Pediatrics    [employer]        President, American Urological Association - [place] Section    Past-President, Societies for Pediatric Urology

## 2025-01-23 NOTE — ASSESSMENT
[FreeTextEntry1] : UBALDO has penile torsion and so the circumcision was appropriately deferred. I discussed the implications and management options including observation and surgery. The principles of the operation and the anticipated postoperative course were discussed.  After discussing the risks and benefits and possible complications (including but not limited to incomplete detorsion of the penis, penile injury, bleeding, infection, penile deformity and need for additional surgery), the decision to proceed with detorsion and circumcision surgery under general anesthesia was made when he reaches 6 months.  All questions were answered.

## 2025-02-04 NOTE — PROCEDURE
[FreeTextEntry3] :  CIRCUMCISION [FreeTextEntry5] : NONE [FreeTextEntry6] :  BANDAGE X 48 HRS BACITRACIN EVERY DIAPER CHANGE AFTER BANDAGE OFF X 2 DAYS THEN SWITCH TO VASELINE/AQUAPHOR X 1 MONTH BATHE 72 HRS FU 2-3 WEEKS (PHOTO OK)

## 2025-02-04 NOTE — CONSULT LETTER
[FreeTextEntry1] :   Dear Dr. RIOS   Our mutual patient, UBALDO YORK underwent surgery today as outlined below. The procedure went well and he was discharged from the PACU after an uneventful stay. Discharge instructions were provided in writing. Instructions regarding follow up were also provided.   Sincerely,   Steve Arora MD, FACS, [facility] Chief, Pediatric Urology Professor of Urology and Pediatrics [facility] [employer] at [facility].   President-elect, American Urological Association

## 2025-02-04 NOTE — CONSULT LETTER
[FreeTextEntry1] :   Dear Dr. RIOS   Our mutual patient, UBALDO YORK underwent surgery today as outlined below. The procedure went well and he was discharged from the PACU after an uneventful stay. Discharge instructions were provided in writing. Instructions regarding follow up were also provided.   Sincerely,   Steve Arora MD, FACS, [facility] Chief, Pediatric Urology Professor of Urology and Pediatrics [employer] at [facility].   President-elect, American Urological Association

## 2025-02-27 NOTE — PHYSICAL EXAM
[Alert] : alert [Well Nourished] : well nourished [Conjunctiva Non-injected] : conjunctiva non-injected [No Visual Lymphadenopathy] : no visual  lymphadenopathy [No Clubbing] : no clubbing [No Edema] : no edema [No Bromhidrosis] : no bromhidrosis [No Chromhidrosis] : no chromhidrosis [FreeTextEntry3] : subtle ~1x0.5cm subcutaneous nodule with blue hue on R lateral upper eyelid poorly demarcated rough pink plaques on upper extremities and trunk deviated gluteal cleft

## 2025-02-27 NOTE — ASSESSMENT
[Use of independent historian: [ enter independent historian's relationship to patient ] :____] : As the patient was unable to provide a complete and reliable history, I obtained clinical history from the patient's [unfilled] [FreeTextEntry1] :  #Infantile Hemangioma x 1 on R lateral upper eyelid- improving - Hemangiomas were prev discussed in detail, including treatment indications and options and natural history. These lesions occur in 5-10% of all children in the [place] and represent a benign tumor of blood vessels and endothelial cells. The vast majority of hemangiomas are uncomplicated and are followed conservatively without therapy. Treatment is indicated, however, for disfiguring, bleeding, ulcerated or medically-complicated lesions. - c/w Hemangeol, cleared by cardiology, can increase from 1.4 ml until reach new goal 1.9 ml PO BID (2 mg/kd/day div BID) - Call if starts to scab or bleed  #Eczema, chronic, flaring with #Xerosis - Orientation provided about nature of condition, treatment expectations, alternatives, risks and benefits - START hydrocortisone 2.5% ointment affected areas  - Side Effects were reviewed with patient including atrophy, dyspigmentation, telangiectasias, striae. Proper use reviewed including only using to affected area and avoidance of prolonged use. - Dry/gentle skin care reviewed. Advised switching to Vanicream brand products. - Recommended avoid products with fragrance; if applicable, including wipes, eye makeup.   #High Risk Medication use - Risks of propranolol/hemangeol include low blood pressure, low blood sugar, low heart rate, low body temperature, and night terrors. - Counseled to only administer after a feed and to hold if baby is not feeding, wheezing, or any concerns. -If pt spits up do not give additional medication. Do not give medication if not eating. If infant misses a dose do not double up. Instructed to hold in case of any serious respiratory illness - Discussed doses should be given approx 6 hours apart, and 3 hours prior to bedtime - [facility] rx to [facility] in [facility]  #Xerosis - principles of dry skin care extensively reviewed including the importance of using an emollient at least once a day and avoiding fragranced products including soap and detergent  Dry skin care reviewed:   - Take short showers/baths (avoid hot water)   - Use a mild soap (eg. CeraVe cleanser or Aquaphor)   - Use soap only on areas truly needed (underarms,groin,buttocks,fold areas, feet, face, hair)   - Pat off excess water and put moisturizer on immediately (within 3 min.)               Good moisturizing choices include:                        1. Cetaphil cream (not baby Cetaphil)                        2. CeraVe cream                        3. Vanicream cream                        4. Aquaphor ointment                        5. Vaseline ointment                        6. CeraVe ointment   - A moisturizer should always be applied after showering or bathing, but may be applied as many additional times as is necessary.  RTC 4-8 weeks

## 2025-02-27 NOTE — HISTORY OF PRESENT ILLNESS
[FreeTextEntry1] : f/u Hemangioma  [de-identified] : Marek is a 8m ex 32w 6 day, #Hemangioma, R eyelid on Hemangeol 1.4ml PO BID after feeds, tolerating well (2mg/kg/day div BID)  background hx: - born via c section, s/p HELLP, was initially in [facility] NICU x 1 month- had NEC, came home for four days, and went back to [facility], was anemic and hypoglycemic and tx for NEC again -complicated by consistent hypogylcemic episodes, most recent at the end of September here for evaluation of below - noticed the spot oct 2024 ago, went to ophthalmologist. said to observe for now  - mom feels spot stable/improving  s- aveeno d: chriss turner m: tubby  jose   d/s: none  No other changing or concerning lesions. No itchy, growing, bleeding, painful, or changing moles.

## 2025-02-27 NOTE — CONSULT LETTER
[Dear  ___] : Dear  [unfilled], [Consult Letter:] : I had the pleasure of evaluating your patient, [unfilled]. [Please see my note below.] : Please see my note below. [Consult Closing:] : Thank you very much for allowing me to participate in the care of this patient.  If you have any questions, please do not hesitate to contact me. [Sincerely,] : Sincerely, [FreeTextEntry3] : Mia Alonso MD  [facility] Pediatric Dermatology

## 2025-02-27 NOTE — HISTORY OF PRESENT ILLNESS
[FreeTextEntry1] : f/u Hemangioma  [de-identified] : Marek is a 8m ex 32w 6 day, #Hemangioma, R eyelid on Hemangeol 1.4ml PO BID after feeds, tolerating well (2mg/kg/day div BID)  background hx: - born via c section, s/p HELLP, was initially in [facility] NICU x 1 month- had NEC, came home for four days, and went back to [facility], was anemic and hypoglycemic and tx for NEC again -complicated by consistent hypogylcemic episodes, most recent at the end of September here for evaluation of below - noticed the spot oct 2024 ago, went to ophthalmologist. said to observe for now  - mom feels spot stable/improving  s- aveeno d: chriss turner m: tubby  jose   d/s: none  No other changing or concerning lesions. No itchy, growing, bleeding, painful, or changing moles.

## 2025-02-27 NOTE — CONSULT LETTER
[Dear  ___] : Dear  [unfilled], [Consult Letter:] : I had the pleasure of evaluating your patient, [unfilled]. [Please see my note below.] : Please see my note below. [Consult Closing:] : Thank you very much for allowing me to participate in the care of this patient.  If you have any questions, please do not hesitate to contact me. [Sincerely,] : Sincerely, [FreeTextEntry3] : Mia Alonso MD  [facility] Pediatric Dermatology

## 2025-02-27 NOTE — ASSESSMENT
[Use of independent historian: [ enter independent historian's relationship to patient ] :____] : As the patient was unable to provide a complete and reliable history, I obtained clinical history from the patient's [unfilled] [FreeTextEntry1] :  #Infantile Hemangioma x 1 on R lateral upper eyelid- improving - Hemangiomas were prev discussed in detail, including treatment indications and options and natural history. These lesions occur in 5-10% of all children in the [place] and represent a benign tumor of blood vessels and endothelial cells. The vast majority of hemangiomas are uncomplicated and are followed conservatively without therapy. Treatment is indicated, however, for disfiguring, bleeding, ulcerated or medically-complicated lesions. - c/w Hemangeol, cleared by cardiology, can increase from 1.4 ml until reach new goal 1.9 ml PO BID (2 mg/kd/day div BID) - Call if starts to scab or bleed  #Eczema, chronic, flaring with #Xerosis - Orientation provided about nature of condition, treatment expectations, alternatives, risks and benefits - START hydrocortisone 2.5% ointment affected areas  - Side Effects were reviewed with patient including atrophy, dyspigmentation, telangiectasias, striae. Proper use reviewed including only using to affected area and avoidance of prolonged use. - Dry/gentle skin care reviewed. Advised switching to Vanicream brand products. - Recommended avoid products with fragrance; if applicable, including wipes, eye makeup.   #High Risk Medication use - Risks of propranolol/hemangeol include low blood pressure, low blood sugar, low heart rate, low body temperature, and night terrors. - Counseled to only administer after a feed and to hold if baby is not feeding, wheezing, or any concerns. -If pt spits up do not give additional medication. Do not give medication if not eating. If infant misses a dose do not double up. Instructed to hold in case of any serious respiratory illness - Discussed doses should be given approx 6 hours apart, and 3 hours prior to bedtime - Resent rx to [facility] in [facility]  #Xerosis - principles of dry skin care extensively reviewed including the importance of using an emollient at least once a day and avoiding fragranced products including soap and detergent  Dry skin care reviewed:   - Take short showers/baths (avoid hot water)   - Use a mild soap (eg. CeraVe cleanser or Aquaphor)   - Use soap only on areas truly needed (underarms,groin,buttocks,fold areas, feet, face, hair)   - Pat off excess water and put moisturizer on immediately (within 3 min.)               Good moisturizing choices include:                        1. Cetaphil cream (not baby Cetaphil)                        2. CeraVe cream                        3. Vanicream cream                        4. Aquaphor ointment                        5. Vaseline ointment                        6. CeraVe ointment   - A moisturizer should always be applied after showering or bathing, but may be applied as many additional times as is necessary.  RTC 4-8 weeks

## 2025-03-07 NOTE — PLAN
[Early Intervention services reviewed with parent.  Services provided are appropriate for this child.   Continue current services at this time.] : early Intervention services reviewed with parent.  Services provided are appropriate for this child.   Continue current services at this time [Discussed importance of "tummy time" and gave specific recommendations regarding time.] : Discussed importance of "tummy time" and gave specific recommendations regarding time. [Adjusted age milestones discussed at length.] : Adjusted age milestones discussed at length. [Adjusted Age growth and feeding parameters discussed at length.] : Adjusted Age growth and feeding parameters discussed at length.  [Safety counseling given regarding major safety issues for children this age.] : Safety counseling given regarding major safety issues for children this age. [Baby proofing discussed, socket plugs, cord and cable safety, tablecloth-removal.] : Baby proofing discussed, socket plugs, cord and cable safety, tablecloth-removal. [All medications should be stored in a child proof container out of reach of the child.] : All medications should be stored in a child proof container out of reach of the child.  [Reading daily was encouraged.] : Reading daily was encouraged.  [Avoid choking hazards such as peanuts, hot dogs, un-cut grapes, hot dogs, peanut butter, fruits with skins and balloons.] : Avoid choking hazards such as peanuts, hot dogs, un-cut grapes, hot dogs, peanut butter, fruits with skins and balloons.

## 2025-03-07 NOTE — REASON FOR VISIT
[Initial Visit] : an initial visit for [Mother] : mother [Sibling(s)] : sibling(s) [FreeTextEntry2] : assess for developmental delay secondary to prematurity 32.6 weeks and SGA [FreeTextEntry3] : Developmental and behavioral progress is of the utmost importance and involves complex nuance. Monitoring children with developmental and behavioral concerns is essential due to potential lifelong implications of diagnoses.

## 2025-03-07 NOTE — SOCIAL HISTORY
[TextEntry] : lives home with both parents and twin brother Mom is home caring for the twins now, she was a hairdresser  Dad is an  - private business No smoker at home No access to firearms

## 2025-03-07 NOTE — BIRTH HISTORY
[At ___ Weeks Gestation] : at [unfilled] weeks gestation [ Section] : by  section [FreeTextEntry1] : 1 1310 grams  [FreeTextEntry3] : 35 yo  mom with PMHx significant for hypothyroid on Synthroid, gestational HTN, no meds, endometriosis and uterine polyps. Pregnancy complicated by Twin B IUGR and HELLP. Apgar 8/9

## 2025-03-07 NOTE — PHYSICAL EXAM
[Chin in Prone Position] : chin in prone position  [Chest up in Prone] : chest up in prone [Roll Prone to Supine] : roll prone to supine [Roll Supine to Prone] : rolls supine to prone [Up on Forearms Prone] : up on forearms prone [Sits With Arm Support] : sits with arm support [Unfisted] : unfisted [Manipulates Fingers] : manipulates fingers [Transfer] : transfers objects [Alert To Sounds] : alert to sounds [Soothes When Picked Up] : soothes when picked up  [Social Smile] : has a social smile [Orients To Voice] : orients to voice [Holt] : coos [Laughs Aloud] : laughs aloud ["Laney June"] : laney welsh [Razzing] : razzing [Normal] : attention level, irritability, interaction and responsivity appropriate for age [Plantar Grasp] : normal Plantar Grasp [Anterior Protective] : normal anterior protective [Creep] : does not creep [Crawl] : does not crawl [Unilateral Reach/Grasp] : does not unilaterally reach/grasp [Mature Pincer] : does not have mature pincer [Finger Feeding] : does not finger feed [Spoon] : does not use a spoon [Gesture Language] : does not gesture language [Understands "No"] : does not understand "No" [Babbling] : does not babble [de-identified] : hands to mouth [Hip] : abnormal hip tone  [Knee] : abnormal knee tone  [de-identified] : right plagiocephaly  [de-identified] : mildly increased LE tone  [de-identified] : no clonus

## 2025-03-07 NOTE — HISTORY OF PRESENT ILLNESS
[Gestational Age: ___] : Gestational Age in Weeks: [unfilled] [Chronological Age: ___] : Chronological Age in Months: [unfilled] [Corrected Age: ___] : Corrected Age: [unfilled] [Cardiology: ___] : Cardiology:[unfilled] [Dermatology: ___] : Dermatology: [unfilled] [Urology: ___] : Urology: [unfilled] [Gastroenterology: ___] : Gastroenterology: [unfilled] [Ophthalmology: ___] : Ophthalmology: [unfilled] [Pediatric Surgery:___] : Pediatric Surgery: [unfilled] [No Feeding Issues] : no feeding issues. [___ ounces/feeding] : ~FARHAD hoff/feeding [___ Times/day] : [unfilled] times/day [Baby Food] : baby food [None] : No Constipation [Normal] : normal [Early Intervention] : Early Intervention services [___ Minutes] : for [unfilled] minutes [___ Times Weekly] : [unfilled] times weekly [de-identified] : 9/21 b/l inguinal hernia repair  [de-identified] : Katelyn Parson  [de-identified] : 7 pm to 6:30 am, wakes twice to feed

## 2025-05-01 NOTE — PHYSICAL EXAM
[Alert] : alert [Well Nourished] : well nourished [Conjunctiva Non-injected] : conjunctiva non-injected [No Visual Lymphadenopathy] : no visual  lymphadenopathy [No Clubbing] : no clubbing [No Edema] : no edema [No Bromhidrosis] : no bromhidrosis [No Chromhidrosis] : no chromhidrosis [FreeTextEntry3] : subtle ~1x0.5cm subcutaneous nodule with blue hue on R lateral upper eyelid poorly demarcated rough pink plaques on upper extremities and trunk deviated gluteal cleft eroded well demarcated plaques in R lateral popliteal fossa with areas of yellowish scale; similar papule on L leg

## 2025-05-01 NOTE — ASSESSMENT
[Use of independent historian: [ enter independent historian's relationship to patient ] :____] : As the patient was unable to provide a complete and reliable history, I obtained clinical history from the patient's [unfilled] [FreeTextEntry1] :  #Infantile Hemangioma x 1 on R lateral upper eyelid- improving - Hemangiomas were prev discussed in detail, including treatment indications and options and natural history. These lesions occur in 5-10% of all children in the [place] and represent a benign tumor of blood vessels and endothelial cells. The vast majority of hemangiomas are uncomplicated and are followed conservatively without therapy. Treatment is indicated, however, for disfiguring, bleeding, ulcerated or medically-complicated lesions. - c/w Hemangeol, cleared by cardiology; can increase from 1.9 ml until reach new goal 2.1 ml PO BID (2 mg/kd/day div BID) - Instructed parents that once patient reaches 12 OR 13 months of age, may begin to down-titrate off of medication as follows: transition to 2.1 mL qAM for 1 month following by discontinuing the medication. - Call if starts to scab or bleed - Recommended patient follow up with Ophthalmology for follow up examination; referral to [facility], Dr. Lashae Melgoza in [place].  #Impetigo, R popliteal fossa, early spot on L leg  - Discussed the likely nature and anticipated natural course of this lesion  - START mupirocin 2% ointment to affected areas; SED - OBTAINED Bacterial Culture from lesion today  #Eczema, chronic, flaring with #Xerosis - Orientation provided about nature of condition, treatment expectations, alternatives, risks and benefits - CONTINUE hydrocortisone 2.5% ointment affected areas  - Side Effects were reviewed with patient including atrophy, dyspigmentation, telangiectasias, striae. Proper use reviewed including only using to affected area and avoidance of prolonged use. - Dry/gentle skin care reviewed. Advised switching to Vanicream brand products. - Recommended avoid products with fragrance; if applicable, including wipes, eye makeup.   #High Risk Medication use - Risks of propranolol/hemangeol include low blood pressure, low blood sugar, low heart rate, low body temperature, and night terrors. - Counseled to only administer after a feed and to hold if baby is not feeding, wheezing, or any concerns. -If pt spits up do not give additional medication. Do not give medication if not eating. If infant misses a dose do not double up. Instructed to hold in case of any serious respiratory illness - Discussed doses should be given approx 6 hours apart, and 3 hours prior to bedtime - [facility] [place]  #Xerosis - principles of dry skin care extensively reviewed including the importance of using an emollient at least once a day and avoiding fragranced products including soap and detergent - Principles of sunscreen usage / recommendations provided today  Dry skin care reviewed:   - Take short showers/baths (avoid hot water)   - Use a mild soap (eg. CeraVe cleanser or Aquaphor)   - Use soap only on areas truly needed (underarms,groin,buttocks,fold areas, feet, face, hair)   - Pat off excess water and put moisturizer on immediately (within 3 min.)               Good moisturizing choices include:                        1. Cetaphil cream (not baby Cetaphil)                        2. CeraVe cream                        3. Vanicream cream                        4. Aquaphor ointment                        5. Vaseline ointment                        6. CeraVe ointment   - A moisturizer should always be applied after showering or bathing, but may be applied as many additional times as is necessary.  Patient may follow up in 3-4 months, or sooner PRN if any changes, new or growing lesions

## 2025-05-01 NOTE — CONSULT LETTER
[Dear  ___] : Dear  [unfilled], [Consult Letter:] : I had the pleasure of evaluating your patient, [unfilled]. [Please see my note below.] : Please see my note below. [Consult Closing:] : Thank you very much for allowing me to participate in the care of this patient.  If you have any questions, please do not hesitate to contact me. [Sincerely,] : Sincerely, [FreeTextEntry3] : Mia Alonso MD  [facility] Pediatric Dermatology

## 2025-05-01 NOTE — ASSESSMENT
[Use of independent historian: [ enter independent historian's relationship to patient ] :____] : As the patient was unable to provide a complete and reliable history, I obtained clinical history from the patient's [unfilled] [FreeTextEntry1] :  #Infantile Hemangioma x 1 on R lateral upper eyelid- improving - Hemangiomas were prev discussed in detail, including treatment indications and options and natural history. These lesions occur in 5-10% of all children in the [place] and represent a benign tumor of blood vessels and endothelial cells. The vast majority of hemangiomas are uncomplicated and are followed conservatively without therapy. Treatment is indicated, however, for disfiguring, bleeding, ulcerated or medically-complicated lesions. - c/w Hemangeol, cleared by cardiology; can increase from 1.9 ml until reach new goal 2.1 ml PO BID (2 mg/kd/day div BID) - Instructed parents that once patient reaches 12 OR 13 months of age, may begin to down-titrate off of medication as follows: transition to 2.1 mL qAM for 1 month following by discontinuing the medication. - Call if starts to scab or bleed - Recommended patient follow up with Ophthalmology for follow up examination; referral to [facility], Dr. Lashae Melgoza in [place] Provided.  #Impetigo, R popliteal fossa, early spot on L leg  - Discussed the likely nature and anticipated natural course of this lesion  - START mupirocin 2% ointment to affected areas; SED - OBTAINED Bacterial Culture from lesion today  #Eczema, chronic, flaring with #Xerosis - Orientation provided about nature of condition, treatment expectations, alternatives, risks and benefits - CONTINUE hydrocortisone 2.5% ointment affected areas  - Side Effects were reviewed with patient including atrophy, dyspigmentation, telangiectasias, striae. Proper use reviewed including only using to affected area and avoidance of prolonged use. - Dry/gentle skin care reviewed. Advised switching to Vanicream brand products. - Recommended avoid products with fragrance; if applicable, including wipes, eye makeup.   #High Risk Medication use - Risks of propranolol/hemangeol include low blood pressure, low blood sugar, low heart rate, low body temperature, and night terrors. - Counseled to only administer after a feed and to hold if baby is not feeding, wheezing, or any concerns. -If pt spits up do not give additional medication. Do not give medication if not eating. If infant misses a dose do not double up. Instructed to hold in case of any serious respiratory illness - Discussed doses should be given approx 6 hours apart, and 3 hours prior to bedtime - [facility] [place]  #Xerosis - principles of dry skin care extensively reviewed including the importance of using an emollient at least once a day and avoiding fragranced products including soap and detergent - Principles of sunscreen usage / recommendations provided today  Dry skin care reviewed:   - Take short showers/baths (avoid hot water)   - Use a mild soap (eg. CeraVe cleanser or Aquaphor)   - Use soap only on areas truly needed (underarms,groin,buttocks,fold areas, feet, face, hair)   - Pat off excess water and put moisturizer on immediately (within 3 min.)               Good moisturizing choices include:                        1. Cetaphil cream (not baby Cetaphil)                        2. CeraVe cream                        3. Vanicream cream                        4. Aquaphor ointment                        5. Vaseline ointment                        6. CeraVe ointment   - A moisturizer should always be applied after showering or bathing, but may be applied as many additional times as is necessary.  Patient may follow up in 3-4 months, or sooner PRN if any changes, new or growing lesions

## 2025-05-01 NOTE — CONSULT LETTER
[Dear  ___] : Dear  [unfilled], [Consult Letter:] : I had the pleasure of evaluating your patient, [unfilled]. [Please see my note below.] : Please see my note below. [Consult Closing:] : Thank you very much for allowing me to participate in the care of this patient.  If you have any questions, please do not hesitate to contact me. [Sincerely,] : Sincerely, [FreeTextEntry3] : Mia Alonso MD  [facility] Pediatric Dermatology

## 2025-05-01 NOTE — HISTORY OF PRESENT ILLNESS
[FreeTextEntry1] : f/u Hemangioma  [de-identified] : Marek is a 10m ex 32w 6 day, #Hemangioma, R eyelid on Hemangeol 1.9ml PO BID after feeds, tolerating well (2mg/kg/day div BID). Interval improvement in appearance w/o episodes of bleeding/ulceration.  background hx: - born via c section, s/p HELLP, was initially in [facility] NICU x 1 month- had NEC, came home for four days, and went back to [facility], was anemic and hypoglycemic and tx for NEC again -complicated by consistent hypogylcemic episodes, most recent at the end of September here for evaluation of below - noticed the spot oct 2024 ago, went to ophthalmologist. said to observe for now  - mom feels spot stable/improving  s- aveeno d: chriss turner m: tubby  jose   d/s: none  2. R popliteal fossa rash x several days: Noted a red eroded spot which mom has been treating with Bacitracin with modest improvement.   No itchy, growing, bleeding, painful, or changing moles.

## 2025-05-01 NOTE — HISTORY OF PRESENT ILLNESS
[FreeTextEntry1] : f/u Hemangioma  [de-identified] : Marek is a 10m ex 32w 6 day, #Hemangioma, R eyelid on Hemangeol 1.9ml PO BID after feeds, tolerating well (2mg/kg/day div BID). Interval improvement in appearance w/o episodes of bleeding/ulceration.  background hx: - born via c section, s/p HELLP, was initially in [facility] NICU x 1 month- had NEC, came home for four days, and went back to [facility], was anemic and hypoglycemic and tx for NEC again -complicated by consistent hypogylcemic episodes, most recent at the end of September here for evaluation of below - noticed the spot oct 2024 ago, went to ophthalmologist. said to observe for now  - mom feels spot stable/improving  s- aveeno d: chriss turner m: tubby  jose   d/s: none  2. R popliteal fossa rash x several days: Noted a red eroded spot which mom has been treating with Bacitracin with modest improvement.   No itchy, growing, bleeding, painful, or changing moles.